# Patient Record
Sex: MALE | URBAN - METROPOLITAN AREA
[De-identification: names, ages, dates, MRNs, and addresses within clinical notes are randomized per-mention and may not be internally consistent; named-entity substitution may affect disease eponyms.]

---

## 2020-10-06 ENCOUNTER — OFFICE VISIT (OUTPATIENT)
Dept: URBAN - METROPOLITAN AREA CLINIC 68 | Facility: CLINIC | Age: 85
End: 2020-10-06
Payer: MEDICARE

## 2020-10-06 PROCEDURE — 92014 COMPRE OPH EXAM EST PT 1/>: CPT | Performed by: OPHTHALMOLOGY

## 2020-10-06 PROCEDURE — 67028 INJECTION EYE DRUG: CPT | Performed by: OPHTHALMOLOGY

## 2020-10-06 ASSESSMENT — INTRAOCULAR PRESSURE
OS: 10
OD: 14

## 2020-10-06 NOTE — IMPRESSION/PLAN
Impression: Macular degeneration, dry OS. Status: Symptomatic. Plan: The patient notes blurring. Exam and OCT reveal no IRF OS. An IVFA from 04/23/19 demonstrated no leakage. Fundus Photos from 04/23/19 demonstrated drusen. Discussed AREDS/ I Vit and Amsler grid. Pt on EVOA.

## 2020-10-06 NOTE — IMPRESSION/PLAN
Impression: Macular degeneration, wet OD. Status: Symptomatic.
s/p Lucentis x 9 last 4/24/18
s/p Avastin x 22 last 09/08/2020 (consented 02/18/2020) Plan: The patient notes distortion. Exam and OCT reveal a small PED with a mildly blunted foveal depression OD. An IVFA from 04/23/19 demonstrated mild staining. Fundus Photos from 04/23/19 demonstrated drusen. Recommend Avastin. R/B/A discussed with patient. The risks of Avastin were discussed, including off-label use and compounding pharmacy risks, and the patient elects to proceed with Avastin. After 2% Subconjunctival anesthesia & Betadine prep, 1.25mg of Avastin was injected in the eye. Cold compress and Tylenol suggested for pain if needed. Thanks, Val Godfrey. Return in 1 month for re eval Nv AMD, OCT OU.

## 2020-11-17 ENCOUNTER — OFFICE VISIT (OUTPATIENT)
Dept: URBAN - METROPOLITAN AREA CLINIC 68 | Facility: CLINIC | Age: 85
End: 2020-11-17
Payer: MEDICARE

## 2020-11-17 PROCEDURE — 92134 CPTRZ OPH DX IMG PST SGM RTA: CPT | Performed by: OPHTHALMOLOGY

## 2020-11-17 PROCEDURE — 67028 INJECTION EYE DRUG: CPT | Performed by: OPHTHALMOLOGY

## 2020-11-17 PROCEDURE — 92014 COMPRE OPH EXAM EST PT 1/>: CPT | Performed by: OPHTHALMOLOGY

## 2020-11-17 ASSESSMENT — INTRAOCULAR PRESSURE
OD: 15
OS: 13

## 2020-11-17 NOTE — IMPRESSION/PLAN
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 Plan: Monitor IOP.  Coordinate care with Dr. Manuel Laboy

## 2020-11-17 NOTE — IMPRESSION/PLAN
Impression: Macular degeneration, wet OD. Status: Symptomatic.
s/p Lucentis x 9 last 4/24/18
s/p Avastin x 23 last 10/6/2020 (consented 02/18/2020) Plan: The patient notes continued distortion. Exam and OCT reveal a small PED with a mildly blunted foveal depression OD. An IVFA from 04/23/19 demonstrated mild staining. Fundus Photos from 04/23/19 demonstrated drusen. Recommend Avastin. R/B/A discussed with patient. The risks of Avastin were discussed, including off-label use and compounding pharmacy risks, and the patient elects to proceed with Avastin. After 2% Subconjunctival anesthesia & Betadine prep, 1.25mg of Avastin was injected in the eye. Cold compress and Tylenol suggested for pain if needed. Thanks, Bayron Landaverde. Return in 1 month for re eval Nv AMD, OCT OU.

## 2020-12-15 ENCOUNTER — OFFICE VISIT (OUTPATIENT)
Dept: URBAN - METROPOLITAN AREA CLINIC 68 | Facility: CLINIC | Age: 85
End: 2020-12-15
Payer: MEDICARE

## 2020-12-15 PROCEDURE — 92014 COMPRE OPH EXAM EST PT 1/>: CPT | Performed by: OPHTHALMOLOGY

## 2020-12-15 PROCEDURE — 92134 CPTRZ OPH DX IMG PST SGM RTA: CPT | Performed by: OPHTHALMOLOGY

## 2020-12-15 PROCEDURE — 67028 INJECTION EYE DRUG: CPT | Performed by: OPHTHALMOLOGY

## 2020-12-15 ASSESSMENT — INTRAOCULAR PRESSURE
OD: 20
OS: 14

## 2020-12-15 NOTE — IMPRESSION/PLAN
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 Plan: Monitor IOP.  Coordinate care with Dr. Wilford Osler

## 2020-12-15 NOTE — IMPRESSION/PLAN
Impression: Macular degeneration, wet OD. Status: Symptomatic.
s/p Lucentis x 9 last 4/24/18
s/p Avastin x 24 last 11/17/2020 (consented 02/18/2020) Plan: Exam and OCT reveal a small PED with a mildly blunted foveal depression OD. An IVFA from 04/23/19 demonstrated mild staining. Fundus Photos from 04/23/19 demonstrated drusen. Recommend Avastin. R/B/A discussed with patient. The risks of Avastin were discussed, including off-label use and compounding pharmacy risks, and the patient elects to proceed with Avastin. After 2% Subconjunctival anesthesia & Betadine prep, 1.25mg of Avastin was injected in the eye. Cold compress and Tylenol suggested for pain if needed. Thanks, Andrew Higuera. 

Return in 1 month for re eval Nv AMD, OCT OU, possible Lucentis

## 2021-01-12 ENCOUNTER — OFFICE VISIT (OUTPATIENT)
Dept: URBAN - METROPOLITAN AREA CLINIC 71 | Facility: CLINIC | Age: 86
End: 2021-01-12
Payer: MEDICARE

## 2021-01-12 PROCEDURE — 92134 CPTRZ OPH DX IMG PST SGM RTA: CPT | Performed by: OPHTHALMOLOGY

## 2021-01-12 PROCEDURE — 92004 COMPRE OPH EXAM NEW PT 1/>: CPT | Performed by: OPHTHALMOLOGY

## 2021-01-12 ASSESSMENT — INTRAOCULAR PRESSURE
OS: 12
OD: 13

## 2021-01-12 NOTE — IMPRESSION/PLAN
Impression: Exdtve age-rel mclr degn, right eye, with actv chrdl neovas: H35.3211. Beign followed by Dr Rekha Johnson: Discussed diagnosis in detail with patient. Discussed treatment options with patient.  Continue to follow with Dr Ghulam Hernandez

## 2021-01-12 NOTE — IMPRESSION/PLAN
Impression: Dry eye syndrome of bilateral lacrimal glands: H04.123.  Plan: Recommend wife give patient artificial tears 2-3 times a day for dryness

## 2021-01-12 NOTE — IMPRESSION/PLAN
Impression: Nexdtve age-rel mclr degn, l eye, adv atrpc w/o sbfvl invl: L64.2607.  Plan: See plan OD

## 2021-02-09 ENCOUNTER — OFFICE VISIT (OUTPATIENT)
Dept: URBAN - METROPOLITAN AREA CLINIC 68 | Facility: CLINIC | Age: 86
End: 2021-02-09
Payer: MEDICARE

## 2021-02-09 DIAGNOSIS — H04.123 DRY EYE SYNDROME OF BILATERAL LACRIMAL GLANDS: ICD-10-CM

## 2021-02-09 PROCEDURE — 92134 CPTRZ OPH DX IMG PST SGM RTA: CPT | Performed by: OPHTHALMOLOGY

## 2021-02-09 PROCEDURE — 92014 COMPRE OPH EXAM EST PT 1/>: CPT | Performed by: OPHTHALMOLOGY

## 2021-02-09 PROCEDURE — 67028 INJECTION EYE DRUG: CPT | Performed by: OPHTHALMOLOGY

## 2021-02-09 ASSESSMENT — INTRAOCULAR PRESSURE
OS: 19
OD: 19

## 2021-02-09 NOTE — IMPRESSION/PLAN
Impression: Macular degeneration, wet OD. Status: Symptomatic.
s/p Lucentis x 9 last 4/24/18
s/p Avastin x 24 last 11/17/2020 (consented 02/18/2020) Plan: The patient notes worsening. Exam and OCT reveal a small PED with a mildly blunted foveal depression OD. An IVFA from 04/23/19 demonstrated mild staining. Fundus Photos from 04/23/19 demonstrated drusen. Recommend Lucentis. R/B/A discussed with patient. Patient elects to proceed with Lucentis 0.5mg today. After 2% Subconjunctival anesthesia & betadine prep, Lucentis was injected in the eye with no complications. Remainder discarded. Cold compress and Tylenol suggested for pain if needed. 


Return in 1 month for re eval Nv AMD, OCT OU, possible Lucentis

## 2021-02-09 NOTE — IMPRESSION/PLAN
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 Plan: Monitor IOP.  Coordinate care with Dr. Cheryl Navarro

## 2021-03-02 ENCOUNTER — OFFICE VISIT (OUTPATIENT)
Dept: URBAN - METROPOLITAN AREA CLINIC 75 | Facility: CLINIC | Age: 86
End: 2021-03-02
Payer: COMMERCIAL

## 2021-03-02 DIAGNOSIS — H52.4 PRESBYOPIA: Primary | ICD-10-CM

## 2021-03-02 DIAGNOSIS — H25.813 COMBINED FORMS OF AGE-RELATED CATARACT, BILATERAL: ICD-10-CM

## 2021-03-02 PROCEDURE — 92004 COMPRE OPH EXAM NEW PT 1/>: CPT | Performed by: OPTOMETRIST

## 2021-03-02 ASSESSMENT — VISUAL ACUITY
OD: CF
OS: 20/70

## 2021-03-02 NOTE — IMPRESSION/PLAN
Impression: Nexdtve age-rel mclr degn, l eye, adv atrpc w/o sbfvl involv: H35.3123.  Plan: See plan #2

## 2021-03-02 NOTE — IMPRESSION/PLAN
Impression: Exdtve age-rel mclr degn, right eye, with actv chrdl neovas: H35.3211. Plan: Continue care w/ Dr. Alethea Moncada.

## 2021-03-09 ENCOUNTER — OFFICE VISIT (OUTPATIENT)
Dept: URBAN - METROPOLITAN AREA CLINIC 68 | Facility: CLINIC | Age: 86
End: 2021-03-09
Payer: MEDICARE

## 2021-03-09 PROCEDURE — 92134 CPTRZ OPH DX IMG PST SGM RTA: CPT | Performed by: OPHTHALMOLOGY

## 2021-03-09 PROCEDURE — 67028 INJECTION EYE DRUG: CPT | Performed by: OPHTHALMOLOGY

## 2021-03-09 PROCEDURE — 92014 COMPRE OPH EXAM EST PT 1/>: CPT | Performed by: OPHTHALMOLOGY

## 2021-03-09 ASSESSMENT — INTRAOCULAR PRESSURE
OD: 19
OS: 18

## 2021-03-09 NOTE — IMPRESSION/PLAN
Impression: Macular degeneration, wet OD. Status: Symptomatic.
s/p Lucentis x 10  last 02/09/2021 
s/p Avastin x 24 last 11/17/2020 (consented 02/18/2020) Plan: Exam and OCT reveal a small PED with a mildly blunted foveal depression OD. An IVFA from 04/23/19 demonstrated mild staining. Fundus Photos from 04/23/19 demonstrated drusen. Recommend Lucentis. R/B/A discussed with patient. Patient elects to proceed with Lucentis 0.5mg today. After 2% Subconjunctival anesthesia & betadine prep, Lucentis was injected in the eye with no complications. Remainder discarded. Cold compress and Tylenol suggested for pain if needed. 


Return in 1 month for re eval Nv AMD, OCT OU, possible Lucentis

## 2021-04-20 ENCOUNTER — OFFICE VISIT (OUTPATIENT)
Dept: URBAN - METROPOLITAN AREA CLINIC 68 | Facility: CLINIC | Age: 86
End: 2021-04-20
Payer: MEDICARE

## 2021-04-20 PROCEDURE — 67028 INJECTION EYE DRUG: CPT | Performed by: OPHTHALMOLOGY

## 2021-04-20 PROCEDURE — 92134 CPTRZ OPH DX IMG PST SGM RTA: CPT | Performed by: OPHTHALMOLOGY

## 2021-04-20 PROCEDURE — 92014 COMPRE OPH EXAM EST PT 1/>: CPT | Performed by: OPHTHALMOLOGY

## 2021-04-20 ASSESSMENT — INTRAOCULAR PRESSURE
OD: 25
OS: 17

## 2021-05-18 ENCOUNTER — OFFICE VISIT (OUTPATIENT)
Dept: URBAN - METROPOLITAN AREA CLINIC 68 | Facility: CLINIC | Age: 86
End: 2021-05-18
Payer: MEDICARE

## 2021-05-18 DIAGNOSIS — H25.13 AGE-RELATED NUCLEAR CATARACT, BILATERAL: ICD-10-CM

## 2021-05-18 PROCEDURE — 67028 INJECTION EYE DRUG: CPT | Performed by: OPHTHALMOLOGY

## 2021-05-18 PROCEDURE — 92134 CPTRZ OPH DX IMG PST SGM RTA: CPT | Performed by: OPHTHALMOLOGY

## 2021-05-18 PROCEDURE — 99214 OFFICE O/P EST MOD 30 MIN: CPT | Performed by: OPHTHALMOLOGY

## 2021-05-18 ASSESSMENT — INTRAOCULAR PRESSURE
OS: 13
OD: 18

## 2021-05-18 NOTE — IMPRESSION/PLAN
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 Plan: Monitor IOP.  Coordinate care with Dr. Norm Sanchez

## 2021-05-18 NOTE — IMPRESSION/PLAN
Impression: Macular degeneration, wet OD. Status: Symptomatic.
s/p Lucentis x 12  last 04/20/2021 
s/p Avastin x 24 last 11/17/2020 (consented 02/18/2020) Plan: The patient notes continued blurring. Exam and OCT reveal a small PED with a mildly blunted foveal depression OD. An IVFA from 04/23/19 demonstrated mild staining. Fundus Photos from 04/23/19 demonstrated drusen. Recommend Lucentis. R/B/A discussed with patient. Patient elects to proceed with Lucentis 0.5mg today. After 2% Subconjunctival anesthesia & betadine prep, Lucentis was injected in the eye with no complications. Remainder discarded. Cold compress and Tylenol suggested for pain if needed. 

Return in 1 month for re eval Nv AMD, OCT OU, possible Lucentis

## 2021-06-29 ENCOUNTER — OFFICE VISIT (OUTPATIENT)
Dept: URBAN - METROPOLITAN AREA CLINIC 68 | Facility: CLINIC | Age: 86
End: 2021-06-29
Payer: MEDICARE

## 2021-06-29 PROCEDURE — 92014 COMPRE OPH EXAM EST PT 1/>: CPT | Performed by: OPHTHALMOLOGY

## 2021-06-29 PROCEDURE — 67028 INJECTION EYE DRUG: CPT | Performed by: OPHTHALMOLOGY

## 2021-06-29 PROCEDURE — 92134 CPTRZ OPH DX IMG PST SGM RTA: CPT | Performed by: OPHTHALMOLOGY

## 2021-06-29 ASSESSMENT — INTRAOCULAR PRESSURE
OS: 13
OD: 19

## 2021-06-29 NOTE — IMPRESSION/PLAN
Impression: Macular degeneration, wet OD. Status: Symptomatic.
s/p Lucentis x 13  last 05/18/2021 
s/p Avastin x 24 last 11/17/2020  Plan: Exam and OCT reveal a small PED with a mildly blunted foveal depression OD. An IVFA from 04/23/19 demonstrated mild staining. Fundus Photos from 04/23/19 demonstrated drusen. Recommend Lucentis. R/B/A discussed with patient. Patient elects to proceed with Lucentis 0.5mg today. After 2% Subconjunctival anesthesia & betadine prep, Lucentis was injected in the eye with no complications. Remainder discarded. Cold compress and Tylenol suggested for pain if needed. 

Return in 1 month for re eval Nv AMD, OCT OU, possible Lucentis

## 2021-06-29 NOTE — IMPRESSION/PLAN
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 Plan: Monitor IOP.  Coordinate care with Dr. Foreign Davis

## 2021-07-27 ENCOUNTER — OFFICE VISIT (OUTPATIENT)
Dept: URBAN - METROPOLITAN AREA CLINIC 68 | Facility: CLINIC | Age: 86
End: 2021-07-27
Payer: MEDICARE

## 2021-07-27 DIAGNOSIS — H35.3123 NEXDTVE AGE-REL MCLR DEGN, L EYE, ADV ATRPC W/O SBFVL INVOLV: ICD-10-CM

## 2021-07-27 PROCEDURE — 67028 INJECTION EYE DRUG: CPT | Performed by: OPHTHALMOLOGY

## 2021-07-27 PROCEDURE — 92014 COMPRE OPH EXAM EST PT 1/>: CPT | Performed by: OPHTHALMOLOGY

## 2021-07-27 PROCEDURE — 92134 CPTRZ OPH DX IMG PST SGM RTA: CPT | Performed by: OPHTHALMOLOGY

## 2021-07-27 ASSESSMENT — INTRAOCULAR PRESSURE
OD: 18
OS: 11

## 2021-07-27 NOTE — IMPRESSION/PLAN
Impression: Macular degeneration, wet OD. Status: Symptomatic.
s/p Lucentis x 14  last 06/29/2021 
s/p Avastin x 24 last 11/17/2020 Plan: The patient notes blurring. Exam and OCT reveal a small PED with a mildly blunted foveal depression OD. An IVFA from 04/23/19 demonstrated mild staining. Fundus Photos from 04/23/19 demonstrated drusen. Recommend Lucentis. R/B/A discussed with patient. Patient elects to proceed with Lucentis 0.5mg today. After 2% Subconjunctival anesthesia & betadine prep, Lucentis was injected in the eye with no complications. Remainder discarded. Cold compress and Tylenol suggested for pain if needed. 

Return in 1 month for re eval Nv AMD, OCT OU, possible Lucentis

## 2021-09-07 ENCOUNTER — OFFICE VISIT (OUTPATIENT)
Dept: URBAN - METROPOLITAN AREA CLINIC 68 | Facility: CLINIC | Age: 86
End: 2021-09-07
Payer: MEDICARE

## 2021-09-07 DIAGNOSIS — H43.813 VITREOUS DEGENERATION, BILATERAL: ICD-10-CM

## 2021-09-07 PROCEDURE — 92134 CPTRZ OPH DX IMG PST SGM RTA: CPT | Performed by: OPHTHALMOLOGY

## 2021-09-07 PROCEDURE — 67028 INJECTION EYE DRUG: CPT | Performed by: OPHTHALMOLOGY

## 2021-09-07 PROCEDURE — 92014 COMPRE OPH EXAM EST PT 1/>: CPT | Performed by: OPHTHALMOLOGY

## 2021-09-07 ASSESSMENT — INTRAOCULAR PRESSURE
OS: 16
OD: 16

## 2021-09-07 NOTE — IMPRESSION/PLAN
Impression: Macular degeneration, 07/27/2021OD. Status: Symptomatic.
s/p Lucentis x 15  last 06/29/2021 
s/p Avastin x 24 last 11/17/2020 Plan: The patient notes blurring. Exam and OCT reveal a small PED with a mildly blunted foveal depression OD. An IVFA from 04/23/19 demonstrated mild staining. Fundus Photos from 04/23/19 demonstrated drusen. Recommend Lucentis. R/B/A discussed with patient. Patient elects to proceed with Lucentis 0.5mg today. After 2% Subconjunctival anesthesia & betadine prep, Lucentis was injected in the eye with no complications. Remainder discarded. Cold compress and Tylenol suggested for pain if needed. Will consider Avastin Return in 1 month for re eval Nv AMD, OCT OU, possible Avastin Romana Meissner)

## 2021-10-05 ENCOUNTER — OFFICE VISIT (OUTPATIENT)
Dept: URBAN - METROPOLITAN AREA CLINIC 68 | Facility: CLINIC | Age: 86
End: 2021-10-05
Payer: MEDICARE

## 2021-10-05 PROCEDURE — 92014 COMPRE OPH EXAM EST PT 1/>: CPT | Performed by: OPHTHALMOLOGY

## 2021-10-05 PROCEDURE — 92134 CPTRZ OPH DX IMG PST SGM RTA: CPT | Performed by: OPHTHALMOLOGY

## 2021-10-05 PROCEDURE — 67028 INJECTION EYE DRUG: CPT | Performed by: OPHTHALMOLOGY

## 2021-10-05 ASSESSMENT — INTRAOCULAR PRESSURE
OS: 13
OD: 18

## 2021-10-05 NOTE — IMPRESSION/PLAN
Impression: Macular degeneration, 07/27/2021OD. Status: Symptomatic.
s/p Lucentis x 156  last 09/07/2021 
s/p Avastin x 24 last 11/17/2020 Plan: The patient notes worsening. Exam and OCT reveal a small PED with a mildly blunted foveal depression OD. An IVFA from 04/23/19 demonstrated mild staining. Fundus Photos from 04/23/19 demonstrated drusen. Recommend Avastin. R/B/A discussed with patient. The risks of Avastin were discussed, including off-label use and compounding pharmacy risks, and the patient elects to proceed with Avastin. After 2% Subconjunctival anesthesia & betadine prep, 1.25mg of Avastin was injected in the eye. Cold compress and Tylenol suggested for pain if needed. 

Return in 1 month for re eval Nv AMD, OCT OU, possible Avastin

## 2021-11-02 ENCOUNTER — OFFICE VISIT (OUTPATIENT)
Dept: URBAN - METROPOLITAN AREA CLINIC 68 | Facility: CLINIC | Age: 86
End: 2021-11-02
Payer: MEDICARE

## 2021-11-02 PROCEDURE — 67028 INJECTION EYE DRUG: CPT | Performed by: OPHTHALMOLOGY

## 2021-11-02 PROCEDURE — 99214 OFFICE O/P EST MOD 30 MIN: CPT | Performed by: OPHTHALMOLOGY

## 2021-11-02 NOTE — IMPRESSION/PLAN
Impression: Macular degeneration, 07/27/2021OD. Status: Symptomatic.
s/p Lucentis x 15  last 09/07/2021 
s/p Avastin x 25 last 10/05/2021 Plan: Exam and OCT reveal a small PED with a mildly blunted foveal depression OD. An IVFA from 04/23/19 demonstrated mild staining. Fundus Photos from 04/23/19 demonstrated drusen. Recommend Avastin. R/B/A discussed with patient. The risks of Avastin were discussed, including off-label use and compounding pharmacy risks, and the patient elects to proceed with Avastin. After 2% Subconjunctival anesthesia & betadine prep, 1.25mg of Avastin was injected in the eye. Cold compress and Tylenol suggested for pain if needed. 

Return in 1 month for re eval Nv AMD, OCT OU, possible Avastin

## 2021-11-30 ENCOUNTER — OFFICE VISIT (OUTPATIENT)
Dept: URBAN - METROPOLITAN AREA CLINIC 68 | Facility: CLINIC | Age: 86
End: 2021-11-30
Payer: MEDICARE

## 2021-11-30 DIAGNOSIS — H40.9 UNSPECIFIED GLAUCOMA: ICD-10-CM

## 2021-11-30 PROCEDURE — 92014 COMPRE OPH EXAM EST PT 1/>: CPT | Performed by: OPHTHALMOLOGY

## 2021-11-30 PROCEDURE — 92134 CPTRZ OPH DX IMG PST SGM RTA: CPT | Performed by: OPHTHALMOLOGY

## 2021-11-30 PROCEDURE — 67028 INJECTION EYE DRUG: CPT | Performed by: OPHTHALMOLOGY

## 2021-11-30 ASSESSMENT — INTRAOCULAR PRESSURE
OD: 17
OS: 14

## 2021-11-30 NOTE — IMPRESSION/PLAN
Impression: Macular degeneration, wet OD. Status: Symptomatic.
s/p Lucentis x 15  last 09/07/2021 
s/p Avastin x 26  last 11/02/2021 Plan: The patient notes blurring. Exam and OCT reveal a small PED with a mildly blunted foveal depression OD. An IVFA from 04/23/19 demonstrated mild staining. Fundus Photos from 04/23/19 demonstrated drusen. Recommend Avastin. R/B/A discussed with patient. The risks of Avastin were discussed, including off-label use and compounding pharmacy risks, and the patient elects to proceed with Avastin. After 2% Subconjunctival anesthesia & betadine prep, 1.25mg of Avastin was injected in the eye. Cold compress and Tylenol suggested for pain if needed. 

Return in 1 month for re eval Nv AMD, OCT OU, possible Avastin

## 2022-01-11 ENCOUNTER — OFFICE VISIT (OUTPATIENT)
Dept: URBAN - METROPOLITAN AREA CLINIC 68 | Facility: CLINIC | Age: 87
End: 2022-01-11
Payer: MEDICARE

## 2022-01-11 PROCEDURE — 92134 CPTRZ OPH DX IMG PST SGM RTA: CPT | Performed by: OPHTHALMOLOGY

## 2022-01-11 PROCEDURE — 67028 INJECTION EYE DRUG: CPT | Performed by: OPHTHALMOLOGY

## 2022-01-11 PROCEDURE — 92014 COMPRE OPH EXAM EST PT 1/>: CPT | Performed by: OPHTHALMOLOGY

## 2022-01-11 ASSESSMENT — INTRAOCULAR PRESSURE
OS: 15
OD: 16

## 2022-01-11 NOTE — IMPRESSION/PLAN
Impression: Macular degeneration, wet OD. Status: Symptomatic.
s/p Lucentis x 15  last 09/07/2021 
s/p Avastin x 27  last 11/30/2021  Plan: The patient notes continued blurring. Exam and OCT reveal a small PED with a mildly blunted foveal depression OD. An IVFA from 04/23/19 demonstrated mild staining. Fundus Photos from 04/23/19 demonstrated drusen. Recommend Avastin. R/B/A discussed with patient. The risks of Avastin were discussed, including off-label use and compounding pharmacy risks, and the patient elects to proceed with Avastin. After 2% Subconjunctival anesthesia & betadine prep, 1.25mg of Avastin was injected in the eye. Cold compress and Tylenol suggested for pain if needed. 

Return in 1 month for re eval Nv AMD, OCT OU, possible Avastin

## 2022-02-08 ENCOUNTER — OFFICE VISIT (OUTPATIENT)
Dept: URBAN - METROPOLITAN AREA CLINIC 68 | Facility: CLINIC | Age: 87
End: 2022-02-08
Payer: MEDICARE

## 2022-02-08 DIAGNOSIS — H35.3211 EXDTVE AGE-REL MCLR DEGN, RIGHT EYE, WITH ACTV CHRDL NEOVAS: Primary | ICD-10-CM

## 2022-02-08 PROCEDURE — 92134 CPTRZ OPH DX IMG PST SGM RTA: CPT | Performed by: OPHTHALMOLOGY

## 2022-02-08 PROCEDURE — 99214 OFFICE O/P EST MOD 30 MIN: CPT | Performed by: OPHTHALMOLOGY

## 2022-02-08 PROCEDURE — 67028 INJECTION EYE DRUG: CPT | Performed by: OPHTHALMOLOGY

## 2022-02-08 ASSESSMENT — INTRAOCULAR PRESSURE
OD: 14
OS: 14

## 2022-02-08 NOTE — IMPRESSION/PLAN
Impression: Macular degeneration, wet OD. Status: Symptomatic.
s/p Lucentis x 15  last 09/07/2021 
s/p Avastin x 28  last 01/11/2022  Plan: Exam and OCT reveal a small PED with a mildly blunted foveal depression OD. An IVFA from 04/23/19 demonstrated mild staining. Fundus Photos from 04/23/19 demonstrated drusen. Recommend Avastin. R/B/A discussed with patient. The risks of Avastin were discussed, including off-label use and compounding pharmacy risks, and the patient elects to proceed with Avastin. After 2% Subconjunctival anesthesia & betadine prep, 1.25mg of Avastin was injected in the eye. Cold compress and Tylenol suggested for pain if needed. 

Return in 1 month for re eval Nv AMD, OCT OU, possible Avastin

## 2022-03-08 ENCOUNTER — OFFICE VISIT (OUTPATIENT)
Dept: URBAN - METROPOLITAN AREA CLINIC 68 | Facility: CLINIC | Age: 87
End: 2022-03-08
Payer: MEDICARE

## 2022-03-08 PROCEDURE — 92134 CPTRZ OPH DX IMG PST SGM RTA: CPT | Performed by: OPHTHALMOLOGY

## 2022-03-08 PROCEDURE — 67028 INJECTION EYE DRUG: CPT | Performed by: OPHTHALMOLOGY

## 2022-03-08 PROCEDURE — 92014 COMPRE OPH EXAM EST PT 1/>: CPT | Performed by: OPHTHALMOLOGY

## 2022-03-08 ASSESSMENT — INTRAOCULAR PRESSURE
OS: 12
OD: 15

## 2022-03-08 NOTE — IMPRESSION/PLAN
Impression: Macular degeneration, wet OD. Status: Symptomatic.
s/p Lucentis x 15  last 09/07/2021 
s/p Avastin x 29   last 02/08/2022 Plan: The patient notes blurring. Exam and OCT reveal a small PED with a mildly blunted foveal depression OD. An IVFA from 04/23/19 demonstrated mild staining. Fundus Photos from 04/23/19 demonstrated drusen. Recommend Avastin. R/B/A discussed with patient. The risks of Avastin were discussed, including off-label use and compounding pharmacy risks, and the patient elects to proceed with Avastin. After 2% Subconjunctival anesthesia & betadine prep, 1.25mg of Avastin was injected in the eye. Cold compress and Tylenol suggested for pain if needed. 

Return in 1 month for re eval Nv AMD, OCT OU, possible Avastin

## 2022-03-24 ENCOUNTER — OFFICE VISIT (OUTPATIENT)
Dept: URBAN - METROPOLITAN AREA CLINIC 75 | Facility: CLINIC | Age: 87
End: 2022-03-24
Payer: COMMERCIAL

## 2022-03-24 PROCEDURE — 92014 COMPRE OPH EXAM EST PT 1/>: CPT | Performed by: OPTOMETRIST

## 2022-03-24 ASSESSMENT — INTRAOCULAR PRESSURE
OS: 8
OD: 10